# Patient Record
Sex: FEMALE | Race: WHITE
[De-identification: names, ages, dates, MRNs, and addresses within clinical notes are randomized per-mention and may not be internally consistent; named-entity substitution may affect disease eponyms.]

---

## 2018-03-28 ENCOUNTER — HOSPITAL ENCOUNTER (OUTPATIENT)
Dept: HOSPITAL 47 - FBPOP | Age: 18
Discharge: HOME | End: 2018-03-28
Payer: MEDICAID

## 2018-03-28 VITALS
RESPIRATION RATE: 16 BRPM | DIASTOLIC BLOOD PRESSURE: 83 MMHG | SYSTOLIC BLOOD PRESSURE: 139 MMHG | TEMPERATURE: 98.3 F | HEART RATE: 96 BPM

## 2018-03-28 DIAGNOSIS — Z3A.20: ICD-10-CM

## 2018-03-28 DIAGNOSIS — O98.812: Primary | ICD-10-CM

## 2018-03-28 DIAGNOSIS — A00.0: ICD-10-CM

## 2018-03-28 LAB
BASOPHILS # BLD AUTO: 0 K/UL (ref 0–0.2)
BASOPHILS NFR BLD AUTO: 0 %
EOSINOPHIL # BLD AUTO: 0.1 K/UL (ref 0–0.7)
EOSINOPHIL NFR BLD AUTO: 1 %
ERYTHROCYTE [DISTWIDTH] IN BLOOD BY AUTOMATED COUNT: 4.19 M/UL (ref 3.8–5.4)
ERYTHROCYTE [DISTWIDTH] IN BLOOD: 14.1 % (ref 11.5–15.5)
HCT VFR BLD AUTO: 34 % (ref 34–46)
HGB BLD-MCNC: 11.1 GM/DL (ref 11.4–16)
LYMPHOCYTES # SPEC AUTO: 0.6 K/UL (ref 1–4.8)
LYMPHOCYTES NFR SPEC AUTO: 9 %
MCH RBC QN AUTO: 26.5 PG (ref 25–35)
MCHC RBC AUTO-ENTMCNC: 32.7 G/DL (ref 31–37)
MCV RBC AUTO: 81.1 FL (ref 80–100)
MONOCYTES # BLD AUTO: 0.3 K/UL (ref 0–1)
MONOCYTES NFR BLD AUTO: 5 %
NEUTROPHILS # BLD AUTO: 5.5 K/UL (ref 1.3–7.7)
NEUTROPHILS NFR BLD AUTO: 85 %
PLATELET # BLD AUTO: 185 K/UL (ref 150–450)
WBC # BLD AUTO: 6.6 K/UL (ref 4–11)

## 2018-03-28 PROCEDURE — 99214 OFFICE O/P EST MOD 30 MIN: CPT

## 2018-03-28 PROCEDURE — 96365 THER/PROPH/DIAG IV INF INIT: CPT

## 2018-03-28 PROCEDURE — 85025 COMPLETE CBC W/AUTO DIFF WBC: CPT

## 2018-03-28 PROCEDURE — 96361 HYDRATE IV INFUSION ADD-ON: CPT

## 2018-03-28 RX ADMIN — POTASSIUM CHLORIDE SCH: 14.9 INJECTION, SOLUTION INTRAVENOUS at 15:18

## 2018-03-28 RX ADMIN — POTASSIUM CHLORIDE SCH MLS/HR: 14.9 INJECTION, SOLUTION INTRAVENOUS at 12:25

## 2018-03-28 NOTE — P.MSEPDOC
Presenting Problems





- Arrival Data


Date of Arrival on Unit: 18


Time of Arrival on Unit: 11:30


Mode of Transport: Ambulatory





- Complaint


OB-Reason for Admission/Chief Complaint: Acute Nausea/Vomiting, Dizziness, Other


Comment: nausea, dizziness, diarrhea x2 weeks





Prenatal Medical History





- Pregnancy Information


: 1


Para: 0


Term: 0


: 0


Abortions: Spontaneous or Elective: 0


Number of Living Children: 0





- Gestational Age


Gestational Age by ADAM (wks/days): 20 Weeks and 5 Days





Review of Systems





- Review of Systems


Constitutional: Fatigue


Breast: No problems


ENT: No problems


Cardiovascular: No problems


Respiratory: No problems


Gastrointestinal: Diarrhea


Genitourinary: No problems


Musculoskeletal: No problems


Neurological: Dizziness


Skin: No problems


Comment: diarrhea x 2 weeks since taking Azythromycin, nausea, stomach pain,

dizzy





Vital Signs





- Temperature


Temperature: 98.3 F


Temperature Source: Temporal Artery Scan





- Pulse


  ** Right Brachial


Pulse Rate: 96


Pulse Assessment Method: Automatic Cuff





- Respirations


Respiratory Rate: 16


Oxygen Delivery Method: Room Air


O2 Sat by Pulse Oximetry: 99





- Blood Pressure


  ** Right Arm


Blood Pressure: 139/83


Blood Pressure Mean: 101


Blood Pressure Source: Automatic Cuff





Medical Screen Scoring (Pre)





- Cervical Exam


Dilation: Exam Deferred


Effacement: Exam Deferred


Membranes: Intact





- Uterine Contractions


Frequency: N/A


Duration: N/A


Intensity: N/A





- Maternal Vital Signs


Maternal Temperature: N/A


Maternal Blood Pressure: N/A


Signs of Preeclampsia: N/A





- Pain Assessment


Pain Location and Character: Abdomen


Pain Scale Used: Numeric (1 - 10)


Pain Intensity: 8


Pain Description: *Acute, Aching


Pain Frequency: Intermittent


Pain Duration: 10


Pain Duration Units: Days


Pain Behavior: None Exhibited


Pain Aggravating Factors: None


Non-Pharmacological Interventions: Darkened Room, Reduce Environmental Stimuli





- Fetal Assessment


Baseline FHR: 146


Fetal Heart Rate - NICHD Category: Category I (Normal) = 0


Fetal Position: N/A


Fetal Station: N/A





- Total Score


Total Score (Pre): 0





- Level of Risk


Level of Risk: Low (0-5)





Physician Notification (Pre)





- Physician Notified


Physician Notified Date: 18


Physician Notified Time: 12:01


Spoke With: Abdiaziz


New Order Received: Yes





- Notification Comment


Comment: cbc, IV fluids, Flagyl IV


I agree with the RN Medical Screening Exam: Yes


Risk & Benefit of care provided described in d/c instruction: Yes


Diagnosis: CHOLERA DUE TO VIBRIO CHOLERAE 01, BIOVAR CHOLERAE

## 2018-04-19 ENCOUNTER — HOSPITAL ENCOUNTER (OUTPATIENT)
Dept: HOSPITAL 47 - FBPOP | Age: 18
Setting detail: OBSERVATION
LOS: 1 days | Discharge: HOME | End: 2018-04-20
Payer: COMMERCIAL

## 2018-04-19 VITALS — BODY MASS INDEX: 22.1 KG/M2

## 2018-04-19 DIAGNOSIS — F90.9: ICD-10-CM

## 2018-04-19 DIAGNOSIS — O99.512: ICD-10-CM

## 2018-04-19 DIAGNOSIS — J45.909: ICD-10-CM

## 2018-04-19 DIAGNOSIS — O16.2: Primary | ICD-10-CM

## 2018-04-19 DIAGNOSIS — O99.342: ICD-10-CM

## 2018-04-19 DIAGNOSIS — Z3A.23: ICD-10-CM

## 2018-04-19 DIAGNOSIS — F12.90: ICD-10-CM

## 2018-04-19 DIAGNOSIS — F41.9: ICD-10-CM

## 2018-04-19 LAB
ALT SERPL-CCNC: 21 U/L (ref 9–52)
AST SERPL-CCNC: 19 U/L (ref 14–36)
BASOPHILS # BLD AUTO: 0 K/UL (ref 0–0.2)
BASOPHILS NFR BLD AUTO: 0 %
BUN SERPL-SCNC: 11 MG/DL (ref 7–17)
EOSINOPHIL # BLD AUTO: 0.1 K/UL (ref 0–0.7)
EOSINOPHIL NFR BLD AUTO: 1 %
ERYTHROCYTE [DISTWIDTH] IN BLOOD BY AUTOMATED COUNT: 4.12 M/UL (ref 3.8–5.4)
ERYTHROCYTE [DISTWIDTH] IN BLOOD: 14 % (ref 11.5–15.5)
HCT VFR BLD AUTO: 33.8 % (ref 34–46)
HGB BLD-MCNC: 11.1 GM/DL (ref 11.4–16)
LDH SPEC-CCNC: 344 U/L (ref 313–618)
LYMPHOCYTES # SPEC AUTO: 1.3 K/UL (ref 1–4.8)
LYMPHOCYTES NFR SPEC AUTO: 17 %
MCH RBC QN AUTO: 26.8 PG (ref 25–35)
MCHC RBC AUTO-ENTMCNC: 32.7 G/DL (ref 31–37)
MCV RBC AUTO: 82.2 FL (ref 80–100)
MONOCYTES # BLD AUTO: 0.4 K/UL (ref 0–1)
MONOCYTES NFR BLD AUTO: 6 %
NEUTROPHILS # BLD AUTO: 5.6 K/UL (ref 1.3–7.7)
NEUTROPHILS NFR BLD AUTO: 75 %
PH UR: 6 [PH] (ref 5–8)
PLATELET # BLD AUTO: 201 K/UL (ref 150–450)
SP GR UR: 1.02 (ref 1–1.03)
URATE SERPL-MCNC: 2 MG/DL (ref 3.7–7.4)
UROBILINOGEN UR QL STRIP: <2 MG/DL (ref ?–2)
WBC # BLD AUTO: 7.5 K/UL (ref 4–11)

## 2018-04-19 PROCEDURE — 84550 ASSAY OF BLOOD/URIC ACID: CPT

## 2018-04-19 PROCEDURE — 84156 ASSAY OF PROTEIN URINE: CPT

## 2018-04-19 PROCEDURE — 82565 ASSAY OF CREATININE: CPT

## 2018-04-19 PROCEDURE — 81050 URINALYSIS VOLUME MEASURE: CPT

## 2018-04-19 PROCEDURE — 84520 ASSAY OF UREA NITROGEN: CPT

## 2018-04-19 PROCEDURE — 99215 OFFICE O/P EST HI 40 MIN: CPT

## 2018-04-19 PROCEDURE — 83615 LACTATE (LD) (LDH) ENZYME: CPT

## 2018-04-19 PROCEDURE — 85025 COMPLETE CBC W/AUTO DIFF WBC: CPT

## 2018-04-19 PROCEDURE — 84460 ALANINE AMINO (ALT) (SGPT): CPT

## 2018-04-19 PROCEDURE — 81003 URINALYSIS AUTO W/O SCOPE: CPT

## 2018-04-19 PROCEDURE — 80306 DRUG TEST PRSMV INSTRMNT: CPT

## 2018-04-19 PROCEDURE — 84450 TRANSFERASE (AST) (SGOT): CPT

## 2018-04-20 VITALS — HEART RATE: 83 BPM | SYSTOLIC BLOOD PRESSURE: 107 MMHG | DIASTOLIC BLOOD PRESSURE: 53 MMHG

## 2018-04-20 VITALS — TEMPERATURE: 97.9 F

## 2018-04-20 VITALS — RESPIRATION RATE: 20 BRPM

## 2018-04-20 LAB
ALT SERPL-CCNC: 17 U/L (ref 9–52)
AST SERPL-CCNC: 14 U/L (ref 14–36)
BASOPHILS # BLD AUTO: 0 K/UL (ref 0–0.2)
BASOPHILS NFR BLD AUTO: 0 %
BUN SERPL-SCNC: 10 MG/DL (ref 7–17)
COLLECTION TIME,URINE: 24 HRS
EOSINOPHIL # BLD AUTO: 0.1 K/UL (ref 0–0.7)
EOSINOPHIL NFR BLD AUTO: 2 %
ERYTHROCYTE [DISTWIDTH] IN BLOOD BY AUTOMATED COUNT: 3.52 M/UL (ref 3.8–5.4)
ERYTHROCYTE [DISTWIDTH] IN BLOOD: 13.9 % (ref 11.5–15.5)
HCT VFR BLD AUTO: 28.7 % (ref 34–46)
HGB BLD-MCNC: 9.5 GM/DL (ref 11.4–16)
LDH SPEC-CCNC: 252 U/L (ref 313–618)
LYMPHOCYTES # SPEC AUTO: 1.5 K/UL (ref 1–4.8)
LYMPHOCYTES NFR SPEC AUTO: 25 %
MCH RBC QN AUTO: 26.9 PG (ref 25–35)
MCHC RBC AUTO-ENTMCNC: 33 G/DL (ref 31–37)
MCV RBC AUTO: 81.7 FL (ref 80–100)
MONOCYTES # BLD AUTO: 0.3 K/UL (ref 0–1)
MONOCYTES NFR BLD AUTO: 6 %
NEUTROPHILS # BLD AUTO: 4 K/UL (ref 1.3–7.7)
NEUTROPHILS NFR BLD AUTO: 66 %
PLATELET # BLD AUTO: 201 K/UL (ref 150–450)
PROT 24H UR-MRATE: 158 MG/24HR (ref 42–225)
SPECIMEN VOL 24H UR: 1575 MLS (ref 800–1800)
URATE SERPL-MCNC: 2.2 MG/DL (ref 3.7–7.4)
WBC # BLD AUTO: 6 K/UL (ref 4–11)

## 2018-04-20 RX ADMIN — LABETALOL HCL SCH: 200 TABLET, FILM COATED ORAL at 13:26

## 2018-04-20 RX ADMIN — LABETALOL HCL SCH: 200 TABLET, FILM COATED ORAL at 06:36

## 2018-04-20 RX ADMIN — LABETALOL HCL SCH: 200 TABLET, FILM COATED ORAL at 20:53

## 2018-08-01 ENCOUNTER — HOSPITAL ENCOUNTER (INPATIENT)
Dept: HOSPITAL 47 - FBPOP | Age: 18
LOS: 2 days | Discharge: HOME | End: 2018-08-03
Attending: OBSTETRICS & GYNECOLOGY | Admitting: OBSTETRICS & GYNECOLOGY
Payer: COMMERCIAL

## 2018-08-01 VITALS — BODY MASS INDEX: 26.5 KG/M2

## 2018-08-01 VITALS — RESPIRATION RATE: 16 BRPM

## 2018-08-01 DIAGNOSIS — Z79.899: ICD-10-CM

## 2018-08-01 DIAGNOSIS — F17.200: ICD-10-CM

## 2018-08-01 DIAGNOSIS — Z3A.38: ICD-10-CM

## 2018-08-01 DIAGNOSIS — F90.9: ICD-10-CM

## 2018-08-01 DIAGNOSIS — J45.909: ICD-10-CM

## 2018-08-01 DIAGNOSIS — F41.9: ICD-10-CM

## 2018-08-01 DIAGNOSIS — F32.9: ICD-10-CM

## 2018-08-01 LAB
ALT SERPL-CCNC: 22 U/L (ref 9–52)
AST SERPL-CCNC: 20 U/L (ref 14–36)
BASOPHILS # BLD AUTO: 0 K/UL (ref 0–0.2)
BASOPHILS NFR BLD AUTO: 0 %
BUN SERPL-SCNC: 9 MG/DL (ref 7–17)
EOSINOPHIL # BLD AUTO: 0.1 K/UL (ref 0–0.7)
EOSINOPHIL NFR BLD AUTO: 1 %
ERYTHROCYTE [DISTWIDTH] IN BLOOD BY AUTOMATED COUNT: 4.37 M/UL (ref 3.8–5.4)
ERYTHROCYTE [DISTWIDTH] IN BLOOD: 14.6 % (ref 11.5–15.5)
HCT VFR BLD AUTO: 35.4 % (ref 34–46)
HGB BLD-MCNC: 11.7 GM/DL (ref 11.4–16)
LDH SPEC-CCNC: 456 U/L (ref 313–618)
LYMPHOCYTES # SPEC AUTO: 1.2 K/UL (ref 1–4.8)
LYMPHOCYTES NFR SPEC AUTO: 16 %
MCH RBC QN AUTO: 26.8 PG (ref 25–35)
MCHC RBC AUTO-ENTMCNC: 33 G/DL (ref 31–37)
MCV RBC AUTO: 81.2 FL (ref 80–100)
MONOCYTES # BLD AUTO: 0.4 K/UL (ref 0–1)
MONOCYTES NFR BLD AUTO: 5 %
NEUTROPHILS # BLD AUTO: 6.1 K/UL (ref 1.3–7.7)
NEUTROPHILS NFR BLD AUTO: 77 %
PLATELET # BLD AUTO: 184 K/UL (ref 150–450)
URATE SERPL-MCNC: 2.9 MG/DL (ref 3.7–7.4)
WBC # BLD AUTO: 7.9 K/UL (ref 4–11)

## 2018-08-01 PROCEDURE — 99215 OFFICE O/P EST HI 40 MIN: CPT

## 2018-08-01 PROCEDURE — 85025 COMPLETE CBC W/AUTO DIFF WBC: CPT

## 2018-08-01 PROCEDURE — 83615 LACTATE (LD) (LDH) ENZYME: CPT

## 2018-08-01 PROCEDURE — 59025 FETAL NON-STRESS TEST: CPT

## 2018-08-01 PROCEDURE — 84450 TRANSFERASE (AST) (SGOT): CPT

## 2018-08-01 PROCEDURE — 84460 ALANINE AMINO (ALT) (SGPT): CPT

## 2018-08-01 PROCEDURE — 84550 ASSAY OF BLOOD/URIC ACID: CPT

## 2018-08-01 PROCEDURE — 84520 ASSAY OF UREA NITROGEN: CPT

## 2018-08-01 PROCEDURE — 82565 ASSAY OF CREATININE: CPT

## 2018-08-01 RX ADMIN — Medication SCH MLS/HR: at 17:48

## 2018-08-01 RX ADMIN — POTASSIUM CHLORIDE SCH MLS/HR: 14.9 INJECTION, SOLUTION INTRAVENOUS at 17:48

## 2018-08-01 NOTE — P.HPOB
History of Present Illness


H&P Date: 18


Chief Complaint: IUP at 38 and 5, preeclampsia





This is a pleasant 17-year-old  1 para 0 at 38-5/7 weeks with an 

estimated due date of 810.  Patient was seen today in the office for routine 

prenatal visit blood pressure noted to be 150/90 recheck 148/90.  Patient was 

sent to the hospital for further monitoring her blood pressures were noted to 

be significantly elevated 160/100.  Patient was then admitted to labor and 

delivery for preeclampsia.





On prenatal blood work blood type noted to be A+, rubella immune, hepatitis 

surface antigen negative, HIV negative, RPR nonreactive, GBS negative.  


Denies signs of preeclampsia she denies headache, visual changes, right upper 

quadrant pain or increased swelling.





Review of Systems


Constitutional: Reports fatigue, Denies chills, Denies fever


Ears, nose, mouth and throat: Denies headache


Cardiovascular: Denies chest pain, Denies edema


Respiratory: Denies dyspnea


Gastrointestinal: Denies constipation, Denies diarrhea


Genitourinary: Reports pregnant





Past Medical History


Past Medical History: Asthma


History of Any Multi-Drug Resistant Organisms: None Reported


Past Surgical History: No Surgical Hx Reported, Tonsillectomy


Past Anesthesia/Blood Transfusion Reactions: No Reported Reaction


Past Psychological History: ADD/ADHD, Anxiety


Smoking Status: Never smoker


Past Alcohol Use History: None Reported


Past Drug Use History: Marijuana





- Past Family History


  ** Father


Family Medical History: No Reported History





Medications and Allergies


 Home Medications











 Medication  Instructions  Recorded  Confirmed  Type


 


Albuterol Sulfate [Proair Hfa] 2 puff INHALATION RT-Q6H PRN 03/14/15 08/01/18 

History


 


Pnv No.95/Ferrous Fum/Folic AC 1 tab PO HS 18 History





[Prenatal Multivitamin Tablet]    











 Allergies











Allergy/AdvReac Type Severity Reaction Status Date / Time


 


No Known Allergies Allergy   Verified 18 16:17














Exam


Osteopathic Statement: *.  No significant issues noted on an osteopathic 

structural exam other than those noted in the History and Physical/Consult.


 Intake and Output











 18





 06:59 14:59 22:59


 


Other:   


 


  Weight   68.039 kg














Results


Result Diagrams: 


 18 16:37





 18 16:37


 Abnormal Lab Results - Last 24 Hours (Table)











  18 Range/Units





  16:37 


 


Uric Acid  2.9 L  (3.7-7.4)  mg/dL














Assessment and Plan


(1) Term pregnancy


Current Visit: Yes   Status: Acute   Code(s): Z34.80 - ENCOUNTER FOR SUPRVSN OF 

NORMAL PREGNANCY, UNSP TRIMESTER   SNOMED Code(s): 39894416


   





(2) Preeclampsia


Current Visit: Yes   Status: Acute   Code(s): O14.90 - UNSPECIFIED PRE-ECLAMPSIA

, UNSPECIFIED TRIMESTER   SNOMED Code(s): 685377493


   





(3) Depression affecting pregnancy in third trimester, antepartum


Current Visit: Yes   Status: Acute   Code(s): O99.343 - OTH MENTAL DISORDERS 

COMPLICATING PREGNANCY, THIRD TRIMESTER; F32.9 - MAJOR DEPRESSIVE DISORDER, 

SINGLE EPISODE, UNSPECIFIED   SNOMED Code(s): 32463186


   


Plan: 





We'll admit to labor and delivery for Pitocin induction of labor.  Amniotomy is 

performed on admission and clear fluid was obtained.  Plan is discussed with 

the patient and all questions are answered.  will monitor BP closely, HELLP 

labs on exam are neg.

## 2018-08-02 PROCEDURE — 3E033VJ INTRODUCTION OF OTHER HORMONE INTO PERIPHERAL VEIN, PERCUTANEOUS APPROACH: ICD-10-PCS

## 2018-08-02 PROCEDURE — 00HU33Z INSERTION OF INFUSION DEVICE INTO SPINAL CANAL, PERCUTANEOUS APPROACH: ICD-10-PCS

## 2018-08-02 PROCEDURE — 3E0R3BZ INTRODUCTION OF ANESTHETIC AGENT INTO SPINAL CANAL, PERCUTANEOUS APPROACH: ICD-10-PCS

## 2018-08-02 PROCEDURE — 0KQM0ZZ REPAIR PERINEUM MUSCLE, OPEN APPROACH: ICD-10-PCS

## 2018-08-02 PROCEDURE — 10907ZC DRAINAGE OF AMNIOTIC FLUID, THERAPEUTIC FROM PRODUCTS OF CONCEPTION, VIA NATURAL OR ARTIFICIAL OPENING: ICD-10-PCS

## 2018-08-02 RX ADMIN — IBUPROFEN PRN MG: 600 TABLET ORAL at 06:29

## 2018-08-02 RX ADMIN — DOCUSATE SODIUM AND SENNOSIDES SCH EACH: 50; 8.6 TABLET ORAL at 20:23

## 2018-08-02 RX ADMIN — DOCUSATE SODIUM AND SENNOSIDES SCH EACH: 50; 8.6 TABLET ORAL at 07:58

## 2018-08-02 RX ADMIN — POTASSIUM CHLORIDE SCH MLS/HR: 14.9 INJECTION, SOLUTION INTRAVENOUS at 01:35

## 2018-08-02 RX ADMIN — POTASSIUM CHLORIDE SCH: 14.9 INJECTION, SOLUTION INTRAVENOUS at 22:43

## 2018-08-02 RX ADMIN — IBUPROFEN PRN MG: 600 TABLET ORAL at 20:22

## 2018-08-02 RX ADMIN — POTASSIUM CHLORIDE SCH MLS/HR: 14.9 INJECTION, SOLUTION INTRAVENOUS at 00:50

## 2018-08-02 RX ADMIN — Medication SCH MLS/HR: at 04:19

## 2018-08-02 NOTE — P.PROBDLV
Vaginal Delivery Note





- .


Vaginal Delivery Note: 





This is a 17-year-old  1 para 0 at 38-5/7 weeks that was seen in the 

office today with elevated pressures 150/90, recheck 148/90.  Patient was sent 

to the hospital for further monitoring.  Blood pressures noted to be 160s over 

low 100 the decision was made to proceed with induction of labor secondary to 

preeclampsia without severe features.   Pitocin induction of labor was begun,  

amniotomy was performed and clear fluid was obtained  Patient did receive 

Stadol 2 during her labor and eventually received an epidural from anesthesia.

  She progressed through labor eventually becoming complete and started pushing 

and had a normal spontaneous vaginal delivery of a viable male infant at 346, 

weight of 7 lbs. 8 oz., Apgars of 8 and 9 at one and 5 minutes respectively.  

Spontaneous cry at birth was noted Afterwards the umbilical cord was doubly 

clamped and cut and the placenta was delivered spontaneously intact with a three

-vessel cord being noted.  On inspection the patient's vaginal vault a second 

degree midline laceration was noted this was repaired in the usual fashion 

afterwards hemostasis was appreciated.  Further inspection the patient's 

vaginal vault revealed no further lacerations.  The uterus was noted to be firm 

and below the umbilicus at this time.  Next





Estimated blood loss 400 mL





Patient and infant tolerated delivery well and are resting comfortably

## 2018-08-02 NOTE — P.PNOBGVD
Subjective





- Subjective


Principal diagnosis: PPD 1 , preeclampsia


Interval history: 





Patient is doing well postpartum.  She denies headache abdominal pain or visual 

changes.  Blood pressures are 140s/80s to 90s.  Other than fatigue she states 

she is doing well.  She is breast-feeding with some difficulty.  She denies 

nausea or vomiting is tolerating regular diet.  She states her pain is well-

controlled.


Patient reports: Reports appetite normal, Reports voiding normally, Reports 

pain well controlled


: doing well





Objective





- Latest Vital Signs


Latest vital signs: 


 Vital Signs











  Temp Pulse Resp BP


 


 18 11:51  98.8 F  95  16  134/86


 


 18 07:51  99.2 F  112 H  16  142/79


 


 18 05:50  98 F  121 H  16  158/74


 


 18 05:20   120 H  16  152/73


 


 18 04:50  98.6 F  127 H  16  146/71


 


 18 04:35   122 H  16  152/72


 


 18 04:20  99.1 F  118 H  16  142/73


 


 18 04:05  99.1 F  115 H  16  150/82


 


 18 03:50  99.8 F H  126 H  16  146/75


 


 18 17:06  97.8 F  108 H  16  167/89








 Intake and Output











 18





 22:59 06:59 14:59


 


Intake Total  31.55 


 


Output Total  500 


 


Balance  -468.45 


 


Intake:   


 


  Intake, IV Titration  31.55 





  Amount   


 


    Oxytocin 20 Units/1000 ml  31.55 





    Ns 1,000 ml @ 1   





    MILLIUNIT/MIN 3 mls/hr IV   





    .Q24H TORSTEN Rx#:[id number]   


 


Output:   


 


  Urine  100 


 


  Estimated Blood Loss  400 


 


Other:   


 


  # Voids 0 0 


 


  Weight 68.039 kg  














- Exam


Extremities: Present: normal


Abdomen: Present: normal appearance, soft


Uterus: Present: firm





- Labs


Labs: 


 Abnormal Lab Results - Last 24 Hours (Table)











  18 Range/Units





  16:37 


 


Uric Acid  2.9 L  (3.7-7.4)  mg/dL














Assessment and Plan


(1) Term pregnancy


Current Visit: Yes   Status: Acute   Code(s): Z34.80 - ENCOUNTER FOR SUPRVSN OF 

NORMAL PREGNANCY, UNSP TRIMESTER   SNOMED Code(s): 73967680


   





(2) Preeclampsia


Current Visit: Yes   Status: Acute   Code(s): O14.90 - UNSPECIFIED PRE-ECLAMPSIA

, UNSPECIFIED TRIMESTER   SNOMED Code(s): 022048615


   





(3) Depression affecting pregnancy in third trimester, antepartum


Current Visit: Yes   Status: Acute   Code(s): O99.343 - OTH MENTAL DISORDERS 

COMPLICATING PREGNANCY, THIRD TRIMESTER; F32.9 - MAJOR DEPRESSIVE DISORDER, 

SINGLE EPISODE, UNSPECIFIED   SNOMED Code(s): 46292845


   





(4) Status post vaginal delivery


Current Visit: Yes   Status: Acute   Code(s): YUB2469 -    SNOMED Code(s): 

739248808


   


Plan: 





Will continue with routine postpartum care.

## 2018-08-03 VITALS — HEART RATE: 93 BPM | TEMPERATURE: 97.9 F

## 2018-08-03 VITALS — DIASTOLIC BLOOD PRESSURE: 84 MMHG | SYSTOLIC BLOOD PRESSURE: 144 MMHG

## 2018-08-03 LAB
BASOPHILS # BLD AUTO: 0 K/UL (ref 0–0.2)
BASOPHILS NFR BLD AUTO: 0 %
EOSINOPHIL # BLD AUTO: 0.1 K/UL (ref 0–0.7)
EOSINOPHIL NFR BLD AUTO: 1 %
ERYTHROCYTE [DISTWIDTH] IN BLOOD BY AUTOMATED COUNT: 3.12 M/UL (ref 3.8–5.4)
ERYTHROCYTE [DISTWIDTH] IN BLOOD: 14.8 % (ref 11.5–15.5)
HCT VFR BLD AUTO: 25.9 % (ref 34–46)
HGB BLD-MCNC: 8.6 GM/DL (ref 11.4–16)
LYMPHOCYTES # SPEC AUTO: 1.7 K/UL (ref 1–4.8)
LYMPHOCYTES NFR SPEC AUTO: 18 %
MCH RBC QN AUTO: 27.6 PG (ref 25–35)
MCHC RBC AUTO-ENTMCNC: 33.3 G/DL (ref 31–37)
MCV RBC AUTO: 82.8 FL (ref 80–100)
MONOCYTES # BLD AUTO: 0.5 K/UL (ref 0–1)
MONOCYTES NFR BLD AUTO: 5 %
NEUTROPHILS # BLD AUTO: 6.8 K/UL (ref 1.3–7.7)
NEUTROPHILS NFR BLD AUTO: 74 %
PLATELET # BLD AUTO: 176 K/UL (ref 150–450)
WBC # BLD AUTO: 9.3 K/UL (ref 4–11)

## 2018-08-03 RX ADMIN — IBUPROFEN PRN MG: 600 TABLET ORAL at 11:56

## 2018-08-03 RX ADMIN — IBUPROFEN PRN MG: 600 TABLET ORAL at 05:23

## 2018-08-03 RX ADMIN — DOCUSATE SODIUM AND SENNOSIDES SCH EACH: 50; 8.6 TABLET ORAL at 08:14

## 2018-08-03 NOTE — P.DS
Providers


Date of admission: 


18 16:38





Expected date of discharge: 18


Attending physician: 


Tanesha Freed





Primary care physician: 


Stated None








- Discharge Diagnosis(es)


(1) Preeclampsia


Current Visit: Yes   Status: Acute   





(2) Status post vaginal delivery


Current Visit: Yes   Status: Acute   





(3) Term pregnancy


Current Visit: Yes   Status: Acute   


Hospital Course: 





The patient is a 17-year-old  1 para 0 admitted at 38-5/7 weeks by good 

dating parameters.  She is admitted after having significant elevated blood 

pressures in the office prompting her to be sent to the hospital where blood 

pressures remained fairly elevated at approximately 160/100.  She is admitted 

for delivery then with the diagnosis of preeclampsia.  Prior to the onset of 

the preeclampsia, her pregnancy had been essentially uncomplicated and group B 

strep status is negative.  On labor and delivery, she had Pitocin augmentation 

started followed by artificial rupture of membranes.  She had an epidural 

catheter placed for analgesia and ultimately progressed to complete where after 

she pushed to a normal spontaneous vaginal delivery of a viable 7 lbs. 8 oz. 

baby boy with Apgars of 8 at 1 minute and 9 at 5 minutes.  Her postpartum 

course was unremarkable with vital signs remaining stable and she was afebrile 

throughout.  Blood pressures were within normal limits at the time of 

discharge.  She was deemed stable for discharge on postpartum day #2 was 

discharged home to follow-up in the office in 6 weeks routinely.  Discharge 

instructions included calling for any significantly increased bleeding or foul-

smelling lochia, significantly increased fever abdominal pain, perineal 

complaints, breast complaints, or anything else that concerned her.  We did 

place him attention to signs and symptoms of elevated blood pressure.  She 

understood all of her instructions and agrees to follow up as noted above.  

Discharge medications included continued prenatal vitamins as she has opted to 

breast-feed.  She otherwise was to use over-the-counter analgesic pain 

medications as needed.  Maternal blood type is A+ and rubella status is immune.


Procedures: 





#1.  Pitocin induction #2.  Artificial rupture of membranes #3.  Epidural 

analgesia #4.  Normal spontaneous vaginal delivery #5.  Repair of perineal 

laceration


Patient Condition at Discharge: Stable





Plan - Discharge Summary


New Discharge Prescriptions: 


No Action


   Albuterol Sulfate [Proair Hfa] 2 puff INHALATION RT-Q6H PRN


     PRN Reason: Wheezing


   Pnv No.95/Ferrous Fum/Folic AC [Prenatal Multivitamin Tablet] 1 tab PO HS


   Sertraline [Zoloft] 50 mg PO DAILY


Discharge Medication List





Albuterol Sulfate [Proair Hfa] 2 puff INHALATION RT-Q6H PRN 03/14/15 [History]


Pnv No.95/Ferrous Fum/Folic AC [Prenatal Multivitamin Tablet] 1 tab PO HS  [History]


Sertraline [Zoloft] 50 mg PO DAILY 18 [History]








Follow up Appointment(s)/Referral(s): 


Tanesha Freed DO [Doctor of Osteopathic Medicine] - 6 Weeks


Discharge Disposition: HOME SELF-CARE

## 2021-05-03 ENCOUNTER — HOSPITAL ENCOUNTER (OUTPATIENT)
Dept: HOSPITAL 47 - ORWHC2ENDO | Age: 21
Discharge: HOME | End: 2021-05-03
Attending: INTERNAL MEDICINE
Payer: COMMERCIAL

## 2021-05-03 VITALS — RESPIRATION RATE: 20 BRPM | HEART RATE: 60 BPM | SYSTOLIC BLOOD PRESSURE: 118 MMHG | DIASTOLIC BLOOD PRESSURE: 73 MMHG

## 2021-05-03 VITALS — BODY MASS INDEX: 20 KG/M2

## 2021-05-03 VITALS — TEMPERATURE: 98.2 F

## 2021-05-03 DIAGNOSIS — Z79.899: ICD-10-CM

## 2021-05-03 DIAGNOSIS — K29.50: Primary | ICD-10-CM

## 2021-05-03 DIAGNOSIS — K21.9: ICD-10-CM

## 2021-05-03 PROCEDURE — 88305 TISSUE EXAM BY PATHOLOGIST: CPT

## 2021-05-03 PROCEDURE — 43239 EGD BIOPSY SINGLE/MULTIPLE: CPT

## 2021-05-03 PROCEDURE — 81025 URINE PREGNANCY TEST: CPT

## 2021-05-03 NOTE — P.PCN
Date of Procedure: 05/03/21


Procedure(s) Performed: 


BRIEF HISTORY: Patient is a 20-year-old, pleasant, white female scheduled for an

upper endoscopy as a part of evaluation of periumbilical and upper abdominal 

pain for the last 1 year duration.  She also has mild GERD and has been on 

omeprazole 20 mg daily.  Celiac serologies were abnormal and hence scheduled for

an upper endoscopy to evaluate further.





PROCEDURE PERFORMED: Esophagogastroduodenoscopy with biopsy.





PREOPERATIVE DIAGNOSIS: Epigastric pain/upper abdominal pain. 





IV sedation per anesthesia. 





PROCEDURE: After informed consent was obtained, the patient  was brought into 

the endoscopy unit. IV sedation was administered by Anesthesia under continuous 

monitoring. Initially the Olympus GIF-140 video endoscope was inserted into the 

mouth. Esophagus intubated without any difficulty. It was gradually advanced 

into the stomach and duodenum and carefully examined. The bulb and the second 

part of the duodenum appeared normal.  Multiple biopsies were done from the 

duodenum to rule out celiac disease.  The scope at this time was withdrawn to 

the stomach, adequately insufflated with air, and upon careful examination, 

mucosa of the antrum had mild linear esophagitis which was biopsied.  The body, 

cardia and the fundus appeared normal. The scope was then withdrawn into the 

esophagus. The GE junction was located at 39 cm from the incisors. The esophagus

appeared normal. There were no erosions or ulcerations seen and the patient 

tolerated the procedure well. 





IMPRESSION: 


1.  Normal-appearing duodenum status post multiple biopsies to rule out celiac 

disease.


2.  Mild antral gastritis.





RECOMMENDATIONS: The findings of this examination were discussed with the p

atient as well as a family.  She was advised to follow with the biopsy results. 

She'll be seen in office in one to 2 weeks.

## 2021-05-13 ENCOUNTER — HOSPITAL ENCOUNTER (OUTPATIENT)
Dept: HOSPITAL 47 - RADUSWWP | Age: 21
Discharge: HOME | End: 2021-05-13
Attending: FAMILY MEDICINE
Payer: COMMERCIAL

## 2021-05-13 DIAGNOSIS — N28.1: Primary | ICD-10-CM

## 2021-05-13 PROCEDURE — 76700 US EXAM ABDOM COMPLETE: CPT

## 2021-05-13 NOTE — US
EXAMINATION TYPE: US abdomen complete

 

DATE OF EXAM: 5/13/2021

 

COMPARISON: NONE

 

CLINICAL HISTORY: R10.9 ABDOMINAL PAIN. generalized abdomen pain

 

EXAM MEASUREMENTS:

 

Liver Length:  15.7 cm   

Gallbladder Wall:  0.2 cm   

CBD:  0.2 cm

Spleen:  11.1 cm   

Right Kidney:  10.6 x 5.1 x 3.6 cm 

Left Kidney:  9.7 x 4.8 x 3.8 cm   

 

 

 

Pancreas:  wnl

Liver:  wnl  

Gallbladder:  wnl

**Evidence for sonographic Rizzo's sign:  neg

CBD:  wnl 

Spleen:  wnl   

Right Kidney:  Lateral upper pole cystic lesion = 0.6 x 0.6 x 0.6 cm.  Medial anechoic lesion at hilu
m = 0.9 x 0.7 cm.      

Left Kidney:  lower pole cystic lesion with septation = 1.6 x 1.4 x 1.4 cm.  Lower pole echogenic foc
us adjacent to cystic lesion = 0.6 cm.  Medial mid cystic lesion = 0.9 x 0.8 x 0.8 cm.      

Upper IVC:  wnl  

Abd Aorta:  No AAA visualized

 

 

 

The liver is homogenous.  The intrahepatic portion of the IVC and proximal abdominal aorta are within
 normal limits.  There is no evidence of cholelithiasis.  Common bile duct is unremarkable.  The visu
alized portions of the pancreas are homogenous.  The spleen is unremarkable.  No renal lesions are se
en.

 

IMPRESSION:

Renal cystic changes noted.

## 2022-08-18 ENCOUNTER — HOSPITAL ENCOUNTER (EMERGENCY)
Dept: HOSPITAL 47 - EC | Age: 22
Discharge: HOME | End: 2022-08-18
Payer: COMMERCIAL
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
## 2022-08-18 DIAGNOSIS — F12.90: ICD-10-CM

## 2022-08-18 DIAGNOSIS — F41.9: ICD-10-CM
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
## 2022-08-18 DIAGNOSIS — F32.A: ICD-10-CM

## 2022-08-18 DIAGNOSIS — F17.290: ICD-10-CM

## 2022-08-18 DIAGNOSIS — K21.9: ICD-10-CM

## 2022-08-18 DIAGNOSIS — J45.909: ICD-10-CM

## 2022-08-18 DIAGNOSIS — S61.210A: Primary | ICD-10-CM

## 2022-08-18 PROCEDURE — 99283 EMERGENCY DEPT VISIT LOW MDM: CPT

## 2022-08-18 PROCEDURE — 12001 RPR S/N/AX/GEN/TRNK 2.5CM/<: CPT

## 2022-08-18 PROCEDURE — 73140 X-RAY EXAM OF FINGER(S): CPT

## 2022-08-18 PROCEDURE — 96372 THER/PROPH/DIAG INJ SC/IM: CPT

## 2022-08-18 NOTE — ED
Wound/Laceration HPI





- General


Chief Complaint: Wound/Laceration


Stated Complaint: R hand lac


Time Seen by Provider: 08/18/22 21:45


Source: patient


Mode of arrival: ambulatory


Limitations: no limitations





- History of Present Illness


Initial Comments: 


Patient is a 21-year-old female presents with laceration.  Patient cut her right

index finger on glass while doing dishes.  Denies numbness and tingling.  

Reports tetanus is up-to-date.








- Related Data


                                Home Medications











 Medication  Instructions  Recorded  Confirmed


 


Albuterol Sulfate [Proair Hfa] 2 puff INHALATION BID 03/14/15 05/03/21


 


Dicyclomine [Bentyl] 20 mg PO TID PRN 04/28/21 05/03/21


 


Ergocalciferol (Vitamin D2) 1,250 mcg PO Q7D 04/28/21 05/03/21





[Vitamin D2 (50,000 Iu)]   


 


Iron 10 mg PO TID 04/28/21 04/28/21


 


Omeprazole 20 mg PO QAM 04/28/21 05/03/21











                                    Allergies











Allergy/AdvReac Type Severity Reaction Status Date / Time


 


No Known Allergies Allergy   Verified 08/18/22 21:21














Review of Systems


ROS Statement: 


Those systems with pertinent positive or pertinent negative responses have been 

documented in the HPI.





ROS Other: All systems not noted in ROS Statement are negative.





Past Medical History


Past Medical History: Asthma, GERD/Reflux


Additional Past Medical History / Comment(s): "Born with a heart murmur. Have 

palpitations related to Asthma".


History of Any Multi-Drug Resistant Organisms: None Reported


Past Surgical History: Tonsillectomy


Additional Past Surgical History / Comment(s): [place] teeth.


Past Anesthesia/Blood Transfusion Reactions: No Reported Reaction, Motion 

Sickness


Past Psychological History: ADD/ADHD, Anxiety, Depression


Smoking Status: Vaper


Past Alcohol Use History: None Reported


Past Drug Use History: Marijuana





- Past Family History


  ** Father


Family Medical History: No Reported History





General Exam


Limitations: no limitations


General appearance: alert


Head exam: Present: atraumatic, normocephalic, normal inspection


Respiratory exam: Present: normal lung sounds bilaterally.  Absent: respiratory 

distress, wheezes, rales, rhonchi, stridor


Cardiovascular Exam: Present: regular rate, normal rhythm, normal heart sounds. 

Absent: systolic murmur, diastolic murmur, rubs, gallop, clicks


Extremities exam: Present: other (2 cm laceration over middle anterior index 

finger.  Neurovascularly intact.  Full range of motion.)


Neurological exam: Present: alert, oriented X3, CN II-XII intact


Psychiatric exam: Present: normal affect, normal mood


Skin exam: Present: warm, dry, intact, normal color.  Absent: rash





Course


                                   Vital Signs











  08/18/22





  21:22


 


Pulse Rate 98


 


Respiratory 26 H





Rate 


 


Blood Pressure 124/85


 


O2 Sat by Pulse 100





Oximetry 














Medical Decision Making





- Medical Decision Making


This is a 21-year-old who presents with laceration.  Thorough history and 

examination were performed. There is 2 cm laceration over middle anterior index 

finger.  Neurovascularly intact.  Full range of motion.








X-ray negative for fracture or foreign body.  Laceration irrigated and well 

approximated with 3 sutures. Wound care instruction discussed in detail.








Dr. Smith is my attending.





Disposition


Clinical Impression: 


 Laceration





Disposition: HOME SELF-CARE


Condition: Good


Instructions (If sedation given, give patient instructions):  Care For Your 

Stitches (DC), Laceration (ED)


Additional Instructions: 


Leave wound uncovered.  Keep wound clean and dry.  Wash with a mild soap. Take 

Tylenol or anti-inflammatories such as Motrin for pain. Follow-up with primary 

care provider in 1-2 days. Return for suture removal in 7-10 days. Report back 

to the emergency department if you experience new, concerning, or worsening 

symptoms.


Is patient prescribed a controlled substance at d/c from ED?: No


Referrals: 


José Miguel Hickey DO [Primary Care Provider] - 1-2 days


Time of Disposition: 23:15

## 2022-08-18 NOTE — XR
EXAMINATION TYPE: XR finger RT

 

DATE OF EXAM: 8/18/2022

 

COMPARISON: NONE

 

HISTORY: Laceration

 

TECHNIQUE: 3 views

 

FINDINGS: I see no fracture nor dislocation. Joint spaces are normal. No evidence of radiopaque forei
gn body.

 

IMPRESSION: Negative right index finger exam.

## 2022-08-19 VITALS — RESPIRATION RATE: 16 BRPM | DIASTOLIC BLOOD PRESSURE: 84 MMHG | SYSTOLIC BLOOD PRESSURE: 137 MMHG | HEART RATE: 55 BPM

## 2023-06-01 ENCOUNTER — HOSPITAL ENCOUNTER (OUTPATIENT)
Dept: HOSPITAL 47 - WWCWWP | Age: 23
End: 2023-06-01
Attending: SURGERY
Payer: COMMERCIAL

## 2023-06-01 VITALS
SYSTOLIC BLOOD PRESSURE: 119 MMHG | RESPIRATION RATE: 16 BRPM | TEMPERATURE: 98.4 F | HEART RATE: 66 BPM | DIASTOLIC BLOOD PRESSURE: 71 MMHG

## 2023-06-01 DIAGNOSIS — F32.A: ICD-10-CM

## 2023-06-01 DIAGNOSIS — F17.290: ICD-10-CM

## 2023-06-01 DIAGNOSIS — Z80.41: ICD-10-CM

## 2023-06-01 DIAGNOSIS — F90.9: ICD-10-CM

## 2023-06-01 DIAGNOSIS — J45.909: ICD-10-CM

## 2023-06-01 DIAGNOSIS — K21.9: ICD-10-CM

## 2023-06-01 DIAGNOSIS — Z80.0: Primary | ICD-10-CM

## 2023-06-01 DIAGNOSIS — F41.9: ICD-10-CM

## 2023-06-14 ENCOUNTER — HOSPITAL ENCOUNTER (OUTPATIENT)
Dept: HOSPITAL 47 - RADUSWWP | Age: 23
End: 2023-06-14
Attending: SURGERY
Payer: COMMERCIAL

## 2023-06-14 DIAGNOSIS — D24.1: Primary | ICD-10-CM

## 2023-06-14 PROCEDURE — 88305 TISSUE EXAM BY PATHOLOGIST: CPT

## 2023-06-14 PROCEDURE — 19083 BX BREAST 1ST LESION US IMAG: CPT

## 2023-06-19 NOTE — USB
Pathology Description: 

Location: 3 o'clock.

Marker Left Behind.

Needle Type: Mammotome      Cores: 4 Skin Nicks: 1 Gauge: 13

The procedure of ultrasound guided core biopsy was explained to the patient.  Benefits,

alternatives, and risks were discussed.  An informed consent was then obtained.  A timeout was

performed.



The patient was placed in supine positioning for  imaging and for the procedure. The overlying skin

was prepped and draped in usual sterile fashion.  Lidocaine was used as anesthetic into the skin and

subcutaneous tissue up to area of concern in the right breast.  A small skin nick was made with

surgical scalpel.



Under ultrasound guidance, a 12-gauge vacuum assisted biopsy gun device was used to obtain 4 core

samples.  A biopsy clip was left in lesion.  T4 butterfly marker was placed



The patient tolerated the procedure well without any immediate complication.  The patient was kept

in the radiology department for short stay after the procedure and then discharged home in stable

condition.



Postprocedure mammogram: Given the patient's age and residual density evident by ultrasound,

mammogram was not performed at this time.







Impression: Successful ultrasound guided core biopsy of area of concern in the     breast, full

pathology results to follow.



Recommendations:

1. Recommendations are pending pathology results. 





Pathology Results: 

Result: Benign, Fibroadenoma.

RIGHT BREAST, 3:00 POSITION, ULTRASOUND GUIDED CORE BIOPSY: Benign fibroadenoma. 





Overall Assessment: Benign





Management: 

Diagnostic Mammogram of the right breast in 6 months.

Electronically signed and approved by: José Miguel Erwin D.O. Radiologis

## 2023-09-25 NOTE — P.GSHP
History of Present Illness


H&P Date: 23


Chief Complaint: abnormal bilateral breast ultrasound





     Liza is a 22 year old white female seen in consultation for Dr. Hickey 

regarding abnormal breast ultrasound.  She is able to feel a lump in her right 

breast for about 8 years. It has not changed in size. She has not had any 

biopsies.  Bilateral breast ultrasound performed on 3to23 which revealed in 

the right breast 3 lesions felt to be most likely fibroadenomas and in the left 

breast at the 9 o'clock position one lesion felt to be a fibroadenoma.  This was

felt to be BIRADS 3 and repeat bilateral ultrasound in 6 months was recommended.

 She is not complaining of any trauma or infection in her breast.  She does not 

complain of any nipple discharge or skin changes. Her menstral periods are 

normal. 





Caffiene: occasional


nicotine: vapes daily


chocolate: daily


BCP: used them at 15 for < 1 year








Family History:


maternal grandmother: multiple myloma


paternal aunt: ovarian cancer


maternal grandfather: kidney cancer








Hormonal History:


menarche: 11


 age at birth: 17


breast fed: yes


periods regular, started yesterday





Surgical History:


tonsil and adenoids


all 4 of wisdom teeth removed








Medical History:


asthma


anxiety disorder


post traumatic stress disorder chronic


depression


gastritis





She'll history:


Nicotine: Vague [place]


Alcohol: Negative


Marijuana: Daily, drugs otherwise : none




















 





- Constitutional


Constitutional: Denies chills, Denies fever





- EENT


Eyes: denies blurred vision, denies pain


Ears: deny: decreased hearing, tinnitus


Ears, nose, mouth and throat: Denies headache, Denies sore throat





- Breasts


Breasts: bilateral: as per HPI





- Cardiovascular


Cardiovascular: Denies chest pain, Denies shortness of breath





- Respiratory


Comment: 





asthma


Respiratory: Denies cough, Denies 7





- Gastrointestinal


Gastrointestinal: Reports as per HPI





- Genitourinary (Female)


Genitourinary: Denies dysuria, Denies hematuria





- Menstruation


Menstruation: Reports period normal





- Musculoskeletal


Musculoskeletal: Denies myalgias





- Integumentary


Integumentary: Reports rash





- Neurological


Neurological: Denies numbness, Denies weakness





- Psychiatric


Psychiatric: Reports as per HPI





- Endocrine


Endocrine: Reports fatigue





- Hematologic/Lymphatic


Comment: 





none





- Allergic/Immunologic


Allergic/Immunologic: Reports as per HPI, Reports seasonal allergies





Past Medical History


Past Medical History: Asthma, GERD/Reflux


Additional Past Medical History / Comment(s): "Born with a heart murmur. Have 

palpitations related to Asthma".


History of Any Multi-Drug Resistant Organisms: None Reported


Past Surgical History: Tonsillectomy


Additional Past Surgical History / Comment(s): Edgar teeth.


Past Anesthesia/Blood Transfusion Reactions: No Reported Reaction, Motion 

Sickness


Past Psychological History: ADD/ADHD, Anxiety, Depression


Smoking Status: Vaper


Past Alcohol Use History: None Reported


Past Drug Use History: Marijuana


Additional Drug Use History / Comment(s): UsesMarijuana three times daily. Aware

no use 24 hrs prior to procedure.





- Past Family History


  ** Father


Family Medical History: No Reported History





Medications and Allergies


                                Home Medications











 Medication  Instructions  Recorded  Confirmed  Type


 


Albuterol Sulfate [Proair Hfa] 2 puff INHALATION BID 03/14/15 06/01/23 History


 


Omeprazole 20 mg PO QAM 21 History


 


busPIRone HCL [Buspar] 7.5 mg PO BID 23 History








                                    Allergies











Allergy/AdvReac Type Severity Reaction Status Date / Time


 


No Known Allergies Allergy   Verified 23 13:51














Surgical - Exam


                                   Vital Signs











Temp Pulse Resp BP Pulse Ox


 


 98.4 F   66   16   119/71   100 


 


 23 13:48  23 13:48  23 13:48  23 13:48  23 13:48














- General


no distress





- Eyes


normal ocular movement





- Neck


trachea midline





- Respiratory


normal respiratory effort, clear to auscultation





- Cardiovascular


Rhythm: regular


Heart Sounds: normal: S1, S2





- Abdomen


Abdomen: soft, non tender, no guarding, no rigid, no rebound





- Integumentary





multiple tattoos





- Neurologic


no disoriented, no combative





- Musculoskeletal


normal gait





- Psychiatric


oriented to time, oriented to person, oriented to place, speech is normal, 

memory intact





Breast exam:


BRA: 34A


Inspection: bilateral grade 1 ptosis bilateral nipple piercing


palpation:


right breast:  multi positional exam nodule at approximately 3 o'clock position 

was approximately 1 cm in size consistent with most likely fibroadenoma


Right axilla: No adenopathy of concern


Left breast: Multi-positional exam no dominant masses or nodules of concern


Left axilla: No adenopathy of concern





Results





Ultrasound results reviewed





Assessment and Plan


Assessment: 





 Impression:


Palpable mass right breast consistent most likely with fibroadenoma seen on 

ultrasound, 3 masses seen on ultrasound all appear to be consistent with 

fibroadenomas, left breast 1 lesions seen and ultrasound felt to be consistent 

with fibroadenoma





Plan:


The lesion in the right breast is palpable and uncomfortable the patient would 

like to have a diagnosis would recommend ultrasound-guided biopsy of the right 

breast palpable Mass.


Probable resection of the right breast palpable mass in the operating room


Follow-up after ultrasound-guided core biopsy right breast lesion


Bilateral breast ultrasound in 6 months





CC: Dr. Hickey Patient's mom was informed and verbalized good understanding and appreciation

## 2025-06-10 ENCOUNTER — HOSPITAL ENCOUNTER (EMERGENCY)
Dept: HOSPITAL 47 - EC | Age: 25
LOS: 1 days | Discharge: HOME | End: 2025-06-11
Payer: COMMERCIAL

## 2025-06-10 VITALS — TEMPERATURE: 98.3 F
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
## 2025-06-10 DIAGNOSIS — S90.31XA: Primary | ICD-10-CM

## 2025-06-10 DIAGNOSIS — F17.290: ICD-10-CM

## 2025-06-10 PROCEDURE — 99283 EMERGENCY DEPT VISIT LOW MDM: CPT

## 2025-06-10 PROCEDURE — 73630 X-RAY EXAM OF FOOT: CPT

## 2025-06-10 PROCEDURE — 73610 X-RAY EXAM OF ANKLE: CPT

## 2025-06-10 PROCEDURE — 96372 THER/PROPH/DIAG INJ SC/IM: CPT

## 2025-06-11 VITALS — DIASTOLIC BLOOD PRESSURE: 81 MMHG | RESPIRATION RATE: 16 BRPM | SYSTOLIC BLOOD PRESSURE: 131 MMHG | HEART RATE: 93 BPM

## 2025-06-11 RX ADMIN — KETOROLAC TROMETHAMINE STA MG: 15 INJECTION, SOLUTION INTRAMUSCULAR; INTRAVENOUS at 01:34

## 2025-06-11 RX ADMIN — ACETAMINOPHEN AND CODEINE PHOSPHATE STA EACH: 300; 30 TABLET ORAL at 01:33
